# Patient Record
Sex: FEMALE | NOT HISPANIC OR LATINO | URBAN - NONMETROPOLITAN AREA
[De-identification: names, ages, dates, MRNs, and addresses within clinical notes are randomized per-mention and may not be internally consistent; named-entity substitution may affect disease eponyms.]

---

## 2019-12-12 NOTE — PATIENT DISCUSSION
SARAH OU:SEVER USE ARTIFICIAL TEARS 5-6  TIMES A DAY AND PRESCRIBED ERYTHROMYCIN OINTMENT AT BEDTIME . RETURN FOR FOLLOW-UP AS SCHEDULED.

## 2019-12-12 NOTE — PATIENT DISCUSSION
New Prescription: erythromycin (erythromycin): ointment: 5 mg/gram (0.5 %) a small amount at bedtime as directed into left eye 12-

## 2020-01-30 NOTE — PATIENT DISCUSSION
DRY EYES : Discussed with patient the importance of keeping the eye moist and the symptoms associated with dry eyes including blurry vision, tearing, burning, and rosemary sensation. Advised patient to minimize use of any fans blowing directly on the face. Advised patient to continue with artificial tears 2-3 times daily.

## 2020-07-17 ENCOUNTER — IMPORTED ENCOUNTER (OUTPATIENT)
Dept: URBAN - NONMETROPOLITAN AREA CLINIC 1 | Facility: CLINIC | Age: 40
End: 2020-07-17

## 2020-07-17 PROBLEM — H52.13: Noted: 2020-07-17

## 2020-07-17 PROCEDURE — 92004 COMPRE OPH EXAM NEW PT 1/>: CPT

## 2020-07-17 PROCEDURE — 92015 DETERMINE REFRACTIVE STATE: CPT

## 2021-08-02 ENCOUNTER — IMPORTED ENCOUNTER (OUTPATIENT)
Dept: URBAN - NONMETROPOLITAN AREA CLINIC 1 | Facility: CLINIC | Age: 41
End: 2021-08-02

## 2021-08-02 PROCEDURE — 92014 COMPRE OPH EXAM EST PT 1/>: CPT

## 2021-08-02 PROCEDURE — 92015 DETERMINE REFRACTIVE STATE: CPT

## 2022-04-09 ASSESSMENT — TONOMETRY
OS_IOP_MMHG: 14
OD_IOP_MMHG: 14
OD_IOP_MMHG: 14
OS_IOP_MMHG: 14

## 2022-04-09 ASSESSMENT — VISUAL ACUITY
OS_CC: 20/25+2
OD_CC: 20/25
OD_CC: 20/25
OS_CC: 20/25+2

## 2024-02-15 ENCOUNTER — COMPREHENSIVE EXAM (OUTPATIENT)
Dept: RURAL CLINIC 1 | Facility: CLINIC | Age: 44
End: 2024-02-15

## 2024-02-15 DIAGNOSIS — H52.13: ICD-10-CM

## 2024-02-15 PROCEDURE — 92015 DETERMINE REFRACTIVE STATE: CPT

## 2024-02-15 PROCEDURE — 92014 COMPRE OPH EXAM EST PT 1/>: CPT

## 2024-02-15 ASSESSMENT — VISUAL ACUITY
OU_SC: 20/20
OD_SC: 20/20
OS_SC: 20/20

## 2024-02-15 ASSESSMENT — TONOMETRY
OS_IOP_MMHG: 15
OD_IOP_MMHG: 15

## 2025-02-19 ENCOUNTER — COMPREHENSIVE EXAM (OUTPATIENT)
Age: 45
End: 2025-02-19

## 2025-02-19 DIAGNOSIS — H52.13: ICD-10-CM

## 2025-02-19 PROCEDURE — 92015 DETERMINE REFRACTIVE STATE: CPT

## 2025-02-19 PROCEDURE — 92014 COMPRE OPH EXAM EST PT 1/>: CPT
